# Patient Record
Sex: FEMALE | Race: WHITE | NOT HISPANIC OR LATINO | Employment: FULL TIME | RURAL
[De-identification: names, ages, dates, MRNs, and addresses within clinical notes are randomized per-mention and may not be internally consistent; named-entity substitution may affect disease eponyms.]

---

## 2020-06-04 ENCOUNTER — OFFICE VISIT (OUTPATIENT)
Dept: FAMILY MEDICINE CLINIC | Facility: CLINIC | Age: 20
End: 2020-06-04

## 2020-06-04 ENCOUNTER — RESULTS ENCOUNTER (OUTPATIENT)
Dept: FAMILY MEDICINE CLINIC | Facility: CLINIC | Age: 20
End: 2020-06-04

## 2020-06-04 VITALS
SYSTOLIC BLOOD PRESSURE: 110 MMHG | TEMPERATURE: 98.3 F | BODY MASS INDEX: 23.43 KG/M2 | HEART RATE: 80 BPM | OXYGEN SATURATION: 100 % | WEIGHT: 154.6 LBS | HEIGHT: 68 IN | DIASTOLIC BLOOD PRESSURE: 60 MMHG

## 2020-06-04 DIAGNOSIS — Z13.220 SCREENING, LIPID: ICD-10-CM

## 2020-06-04 DIAGNOSIS — L65.9 HAIR LOSS: Primary | ICD-10-CM

## 2020-06-04 PROCEDURE — 99213 OFFICE O/P EST LOW 20 MIN: CPT | Performed by: FAMILY MEDICINE

## 2020-06-04 NOTE — PROGRESS NOTES
Chief Complaint   Patient presents with   • Thyroid Problem   • Hair/Scalp Problem       Subjective   Maryann Carreon is a 19 y.o. female.     Patient is here for hair falling out and wants her thyroid checked.  There is family history of thyroid issues.     Thyroid Problem   Presents for initial visit. The condition has lasted for 3 months. Symptoms include fatigue and hair loss. Patient reports no anxiety, cold intolerance, constipation, depressed mood, diaphoresis, dry skin, heat intolerance, menstrual problem, nail problem, palpitations, tremors, weight gain or weight loss. The symptoms have been worsening. Past treatments include nothing. The treatment provided no relief. Risk factors include family history of hyperthyroidism.          I have reviewed and updated her medications, medical history and problem list during today's office visit.       Past Medical History :  Active Ambulatory Problems     Diagnosis Date Noted   • No Active Ambulatory Problems     Resolved Ambulatory Problems     Diagnosis Date Noted   • No Resolved Ambulatory Problems     No Additional Past Medical History       Medication List:  No current outpatient medications on file.    Social History     Tobacco Use   • Smoking status: Never Smoker   • Smokeless tobacco: Never Used   Substance Use Topics   • Alcohol use: Yes     Frequency: 2-4 times a month     Drinks per session: 5 or 6     Binge frequency: Monthly       Review of Systems   Constitutional: Positive for fatigue. Negative for diaphoresis, unexpected weight gain and unexpected weight loss.   Eyes: Negative for blurred vision, double vision and visual disturbance.   Respiratory: Negative for shortness of breath.    Cardiovascular: Negative for chest pain, palpitations and leg swelling.   Gastrointestinal: Negative for constipation.   Endocrine: Negative for cold intolerance and heat intolerance.   Genitourinary: Negative for breast discharge and menstrual problem.   Skin:  "Negative for dry skin.   Neurological: Positive for headache (occasional). Negative for dizziness, tremors and light-headedness.   Hematological: Negative for adenopathy. Does not bruise/bleed easily.   Psychiatric/Behavioral: Negative for agitation and depressed mood. The patient is not nervous/anxious.        I have reviewed and confirmed the accuracy of the ROS as documented by the MA/LPN/RN Kasey Belcher MD      Objective   Vitals:    06/04/20 1629   BP: 110/60   Pulse: 80   Temp: 98.3 °F (36.8 °C)   TempSrc: Oral   SpO2: 100%   Weight: 70.1 kg (154 lb 9.6 oz)   Height: 172.7 cm (68\")     Body mass index is 23.51 kg/m².    Physical Exam   Constitutional: She is oriented to person, place, and time. She appears well-developed and well-nourished. No distress.   HENT:   Head: Normocephalic and atraumatic.   Mouth/Throat: Oropharynx is clear and moist.   Eyes: Pupils are equal, round, and reactive to light. Conjunctivae and EOM are normal.   Neck: Normal range of motion. Neck supple. No JVD present. No tracheal deviation and no edema present. No thyromegaly present.   Cardiovascular: Normal rate, regular rhythm and normal heart sounds.   Pulmonary/Chest: Effort normal and breath sounds normal.   Musculoskeletal: She exhibits no edema.   Lymphadenopathy:     She has no cervical adenopathy.   Neurological: She is alert and oriented to person, place, and time. No cranial nerve deficit.   Skin: Skin is warm. Capillary refill takes less than 2 seconds. No rash noted.   Psychiatric: She has a normal mood and affect. Her behavior is normal. Thought content normal.         Lab Results   Component Value Date    GLU CANCELED 06/04/2020    BUN 9 06/04/2020    CREATININE 0.69 06/04/2020    EGFRIFNONA 125 06/04/2020    EGFRIFAFRI 144 06/04/2020     06/04/2020    K CANCELED 06/04/2020     06/04/2020    CALCIUM 9.3 06/04/2020    ALBUMIN 4.6 06/04/2020    BILITOT 0.6 06/04/2020    ALKPHOS 63 06/04/2020    " AST 15 06/04/2020    ALT 10 06/04/2020    WBC 5.9 06/04/2020    RBC 4.25 06/04/2020    HCT 40.3 06/04/2020    MCV 95 06/04/2020    MCH 32.5 06/04/2020    TSH 1.080 06/04/2020    FREET4 1.16 06/04/2020          Assessment/Plan     Diagnoses and all orders for this visit:    1. Hair loss (Primary)  -     CBC & Differential  -     Comprehensive Metabolic Panel  -     TSH  -     T4, Free  -     T3, Free    2. Screening, lipid  -     Lipid Panel With / Chol / HDL Ratio; Future    Other orders  -     Cancel: TSH        No follow-ups on file.         I wore protective equipment throughout this patient encounter to include mask and gloves. Hand hygiene was performed before donning protective equipment and after removal when leaving the room.

## 2020-06-05 ENCOUNTER — TELEPHONE (OUTPATIENT)
Dept: FAMILY MEDICINE CLINIC | Facility: CLINIC | Age: 20
End: 2020-06-05

## 2020-06-05 LAB
ALBUMIN SERPL-MCNC: 4.6 G/DL (ref 3.9–5)
ALBUMIN/GLOB SERPL: 1.5 {RATIO} (ref 1.2–2.2)
ALP SERPL-CCNC: 63 IU/L (ref 39–117)
ALT SERPL-CCNC: 10 IU/L (ref 0–32)
AST SERPL-CCNC: 15 IU/L (ref 0–40)
BASOPHILS # BLD AUTO: 0 X10E3/UL (ref 0–0.2)
BASOPHILS NFR BLD AUTO: 1 %
BILIRUB SERPL-MCNC: 0.6 MG/DL (ref 0–1.2)
BUN SERPL-MCNC: 9 MG/DL (ref 6–20)
BUN/CREAT SERPL: 13 (ref 9–23)
CALCIUM SERPL-MCNC: 9.3 MG/DL (ref 8.7–10.2)
CHLORIDE SERPL-SCNC: 104 MMOL/L (ref 96–106)
CO2 SERPL-SCNC: 19 MMOL/L (ref 20–29)
CREAT SERPL-MCNC: 0.69 MG/DL (ref 0.57–1)
EOSINOPHIL # BLD AUTO: 0.1 X10E3/UL (ref 0–0.4)
EOSINOPHIL NFR BLD AUTO: 2 %
ERYTHROCYTE [DISTWIDTH] IN BLOOD BY AUTOMATED COUNT: 11.8 % (ref 11.7–15.4)
GLOBULIN SER CALC-MCNC: 3.1 G/DL (ref 1.5–4.5)
GLUCOSE SERPL-MCNC: ABNORMAL MG/DL
HCT VFR BLD AUTO: 40.3 % (ref 34–46.6)
HGB BLD-MCNC: 13.8 G/DL (ref 11.1–15.9)
IMM GRANULOCYTES # BLD AUTO: 0 X10E3/UL (ref 0–0.1)
IMM GRANULOCYTES NFR BLD AUTO: 0 %
LYMPHOCYTES # BLD AUTO: 1.9 X10E3/UL (ref 0.7–3.1)
LYMPHOCYTES NFR BLD AUTO: 33 %
MCH RBC QN AUTO: 32.5 PG (ref 26.6–33)
MCHC RBC AUTO-ENTMCNC: 34.2 G/DL (ref 31.5–35.7)
MCV RBC AUTO: 95 FL (ref 79–97)
MONOCYTES # BLD AUTO: 0.5 X10E3/UL (ref 0.1–0.9)
MONOCYTES NFR BLD AUTO: 8 %
NEUTROPHILS # BLD AUTO: 3.3 X10E3/UL (ref 1.4–7)
NEUTROPHILS NFR BLD AUTO: 56 %
PLATELET # BLD AUTO: 246 X10E3/UL (ref 150–450)
POTASSIUM SERPL-SCNC: ABNORMAL MMOL/L
PROT SERPL-MCNC: 7.7 G/DL (ref 6–8.5)
RBC # BLD AUTO: 4.25 X10E6/UL (ref 3.77–5.28)
SODIUM SERPL-SCNC: 140 MMOL/L (ref 134–144)
T3FREE SERPL-MCNC: 3.4 PG/ML (ref 2–4.4)
T4 FREE SERPL-MCNC: 1.16 NG/DL (ref 0.82–1.77)
TSH SERPL DL<=0.005 MIU/L-ACNC: 1.08 UIU/ML (ref 0.45–4.5)
WBC # BLD AUTO: 5.9 X10E3/UL (ref 3.4–10.8)

## 2020-06-05 NOTE — TELEPHONE ENCOUNTER
Spoke to pt 06/05/2020, 4:11pm advised her of lab results per Dr. Belcher.  Pt states if she needs the glucose or potassium levels she renetta call and request them to be ordered

## 2020-06-05 NOTE — TELEPHONE ENCOUNTER
----- Message from Kasey Belcher MD sent at 6/5/2020  2:45 PM EDT -----  Can you please let patient know that her labs look good.  The sample was not adequate to check her blood sugar or potassium levels, so if she has any concerns about those tests, I can reorder.  But, all her thyroid labs were normal.

## 2020-06-05 NOTE — PROGRESS NOTES
Can you please let patient know that her labs look good.  The sample was not adequate to check her blood sugar or potassium levels, so if she has any concerns about those tests, I can reorder.  But, all her thyroid labs were normal.

## 2020-07-20 ENCOUNTER — OFFICE VISIT (OUTPATIENT)
Dept: FAMILY MEDICINE CLINIC | Facility: CLINIC | Age: 20
End: 2020-07-20

## 2020-07-20 VITALS
SYSTOLIC BLOOD PRESSURE: 121 MMHG | HEART RATE: 98 BPM | OXYGEN SATURATION: 100 % | BODY MASS INDEX: 23.16 KG/M2 | TEMPERATURE: 99.5 F | WEIGHT: 152.8 LBS | HEIGHT: 68 IN | DIASTOLIC BLOOD PRESSURE: 68 MMHG

## 2020-07-20 DIAGNOSIS — L65.9 HAIR LOSS: Primary | ICD-10-CM

## 2020-07-20 PROCEDURE — 99213 OFFICE O/P EST LOW 20 MIN: CPT | Performed by: FAMILY MEDICINE

## 2020-08-14 ENCOUNTER — TELEPHONE (OUTPATIENT)
Dept: FAMILY MEDICINE CLINIC | Facility: CLINIC | Age: 20
End: 2020-08-14

## 2020-08-14 NOTE — TELEPHONE ENCOUNTER
Patient called stating since her last appointment PCP labs where ordered patient has been unable to  Get lab work done due to deductible no being met. Patient stats she is getting worse and super depressed apt made to see PCP and talk about options.

## 2020-08-20 ENCOUNTER — OFFICE VISIT (OUTPATIENT)
Dept: FAMILY MEDICINE CLINIC | Facility: CLINIC | Age: 20
End: 2020-08-20

## 2020-08-20 ENCOUNTER — RESULTS ENCOUNTER (OUTPATIENT)
Dept: FAMILY MEDICINE CLINIC | Facility: CLINIC | Age: 20
End: 2020-08-20

## 2020-08-20 VITALS
OXYGEN SATURATION: 99 % | RESPIRATION RATE: 18 BRPM | HEART RATE: 78 BPM | BODY MASS INDEX: 24.01 KG/M2 | WEIGHT: 158.4 LBS | SYSTOLIC BLOOD PRESSURE: 110 MMHG | TEMPERATURE: 99.4 F | DIASTOLIC BLOOD PRESSURE: 64 MMHG | HEIGHT: 68 IN

## 2020-08-20 DIAGNOSIS — F32.9 REACTIVE DEPRESSION: Primary | ICD-10-CM

## 2020-08-20 DIAGNOSIS — L65.9 HAIR LOSS: ICD-10-CM

## 2020-08-20 PROCEDURE — 99213 OFFICE O/P EST LOW 20 MIN: CPT | Performed by: FAMILY MEDICINE

## 2020-08-20 RX ORDER — CITALOPRAM 20 MG/1
20 TABLET ORAL DAILY
Qty: 90 TABLET | Refills: 2 | Status: SHIPPED | OUTPATIENT
Start: 2020-08-20

## 2020-08-20 NOTE — PROGRESS NOTES
Chief Complaint   Patient presents with   • Depression       Subjective   Maryann Carreon is a 19 y.o. female.     Depression   Visit Type: initial  Episode onset: 1 month.  Progression since onset: gradually worsening  Patient presents with the following symptoms: depressed mood and irritability.  Patient is not experiencing: suicidal ideas.  Frequency of symptoms: occasionally   Severity: mild   Exacerbated by: hair loss.  Sleep quality: fair  Nighttime awakenings: one to two  Risk factors: family history    She feels like her ongoing hair loss is the trigger for her depressive symptoms.      I have reviewed and updated her medications, medical history and problem list during today's office visit.       Past Medical History :  Active Ambulatory Problems     Diagnosis Date Noted   • Reactive depression 08/30/2020   • Hair loss 08/30/2020     Resolved Ambulatory Problems     Diagnosis Date Noted   • No Resolved Ambulatory Problems     No Additional Past Medical History       Medication List:  No current outpatient medications on file.    No Known Allergies    Social History     Tobacco Use   • Smoking status: Never Smoker   • Smokeless tobacco: Never Used   Substance Use Topics   • Alcohol use: Yes     Frequency: 2-4 times a month     Drinks per session: 5 or 6     Binge frequency: Monthly       Review of Systems   Constitutional: Positive for irritability. Negative for chills and fever.   Gastrointestinal: Negative for diarrhea, nausea and vomiting.   Endocrine:        Patient continues to report hair loss   Neurological: Negative for dizziness, tremors, seizures, syncope and weakness.   Psychiatric/Behavioral: Positive for depressed mood. Negative for self-injury and suicidal ideas.         Objective   Vitals:    08/20/20 1652   BP: 110/64   BP Location: Right arm   Patient Position: Sitting   Cuff Size: Adult   Pulse: 78   Resp: 18   Temp: 99.4 °F (37.4 °C)   TempSrc: Oral   SpO2: 99%   Weight: 71.8 kg (158 lb  "6.4 oz)   Height: 172.7 cm (68\")     Body mass index is 24.08 kg/m².    Physical Exam   Constitutional: She is oriented to person, place, and time. She appears well-developed and well-nourished. No distress.   HENT:   Head: Normocephalic and atraumatic.   Mouth/Throat: Oropharynx is clear and moist.   Eyes: Pupils are equal, round, and reactive to light. Conjunctivae and EOM are normal.   Neck: Normal range of motion. Neck supple. No edema present.   Cardiovascular: Normal rate, regular rhythm and normal heart sounds.   Pulmonary/Chest: Effort normal and breath sounds normal.   Musculoskeletal: She exhibits no edema.   Neurological: She is alert and oriented to person, place, and time. No cranial nerve deficit.   Skin: Skin is warm. Capillary refill takes less than 2 seconds. No rash noted.   Psychiatric: She has a normal mood and affect. Her speech is normal and behavior is normal. Judgment and thought content normal. Cognition and memory are normal.         Lab Results   Component Value Date    GLU CANCELED 06/04/2020    BUN 9 06/04/2020    CREATININE 0.69 06/04/2020    EGFRIFNONA 125 06/04/2020    EGFRIFAFRI 144 06/04/2020     06/04/2020    K CANCELED 06/04/2020     06/04/2020    CALCIUM 9.3 06/04/2020    ALBUMIN 4.6 06/04/2020    BILITOT 0.6 06/04/2020    ALKPHOS 63 06/04/2020    AST 15 06/04/2020    ALT 10 06/04/2020    WBC 5.9 06/04/2020    RBC 4.25 06/04/2020    HCT 40.3 06/04/2020    MCV 95 06/04/2020    MCH 32.5 06/04/2020    TSH 1.080 06/04/2020    FREET4 1.16 06/04/2020          Assessment/Plan     Diagnoses and all orders for this visit:    1. Reactive depression (Primary)  Assessment & Plan:  Take medication as prescribed/ordered.  Common side effects discussed.  Watch for worsening mood or behavior, change in sleep pattern, appetite changes, weight gain, or suicidal thoughts.  Allow 4-6 weeks for medication to reach maximal effectiveness.  F/U in office in 4-6 weeks to re-evaluate " medication.    Orders:  -     citalopram (CeleXA) 20 MG tablet; Take 1 tablet by mouth Daily.  Dispense: 90 tablet; Refill: 2    2. Hair loss  Assessment & Plan:  Additional labs ordered.    Orders:  -     Zinc; Future  -     DHEA-Sulfate; Future  -     LYNETTE; Future  -     Testosterone (Free & Total), LC / MS; Future  -     Vitamin D 25 hydroxy; Future      Return if symptoms worsen or fail to improve.     I wore protective equipment throughout this patient encounter to include mask. Hand hygiene was performed before donning protective equipment and after removal when leaving the room.  Patient also wore a mask.

## 2020-08-30 PROBLEM — L65.9 HAIR LOSS: Status: ACTIVE | Noted: 2020-08-30

## 2020-08-30 PROBLEM — F32.9 REACTIVE DEPRESSION: Status: ACTIVE | Noted: 2020-08-30

## 2020-08-30 NOTE — ASSESSMENT & PLAN NOTE
Take medication as prescribed/ordered.  Common side effects discussed.  Watch for worsening mood or behavior, change in sleep pattern, appetite changes, weight gain, or suicidal thoughts.  Allow 4-6 weeks for medication to reach maximal effectiveness.  F/U in office in 4-6 weeks to re-evaluate medication.